# Patient Record
Sex: FEMALE | Race: WHITE | Employment: FULL TIME | URBAN - METROPOLITAN AREA
[De-identification: names, ages, dates, MRNs, and addresses within clinical notes are randomized per-mention and may not be internally consistent; named-entity substitution may affect disease eponyms.]

---

## 2024-08-31 ENCOUNTER — OFFICE VISIT (OUTPATIENT)
Age: 24
End: 2024-08-31

## 2024-08-31 VITALS
BODY MASS INDEX: 25.03 KG/M2 | HEIGHT: 62 IN | DIASTOLIC BLOOD PRESSURE: 80 MMHG | WEIGHT: 136 LBS | SYSTOLIC BLOOD PRESSURE: 110 MMHG | HEART RATE: 88 BPM | OXYGEN SATURATION: 96 % | TEMPERATURE: 97.9 F

## 2024-08-31 DIAGNOSIS — O21.9 NAUSEA AND VOMITING IN PREGNANCY: Primary | ICD-10-CM

## 2024-08-31 RX ORDER — FLUOXETINE 10 MG/1
CAPSULE ORAL
COMMUNITY
Start: 2024-07-07

## 2024-08-31 RX ORDER — SPIRONOLACTONE 100 MG/1
TABLET, FILM COATED ORAL
COMMUNITY
Start: 2024-08-11

## 2024-08-31 RX ORDER — FLUOXETINE 40 MG/1
CAPSULE ORAL
COMMUNITY
Start: 2024-08-11

## 2024-08-31 ASSESSMENT — ENCOUNTER SYMPTOMS
NAUSEA: 1
VOMITING: 1

## 2024-08-31 NOTE — PROGRESS NOTES
Yris Lucas (:  2000) is a 24 y.o. female,New patient, here for evaluation of the following chief complaint(s):  Nausea (Patient is pregnant and now c/o feeling nauseous and vomiting x5 days.)      ASSESSMENT/PLAN:    ICD-10-CM    1. Nausea and vomiting in pregnancy  O21.9         Patient is experiencing nausea and vomiting during her first trimester. VS are stable, but patient has been unable to tolerate any PO since Tuesday. Encouraged patient to begin taking B6 supplements, ginger, and pepcid daily. Avoid triggers. When feeling less nauseous, take small amounts of fluids and bland, small snacks. Educated on signs of worsening condition, such as persistent vomiting with no PO intake and/or dehydration symptoms that would warrant an ER visit. Follow up with OB as scheduled. Her and her  are understanding and agreeable to this plan.     Dx Disposition: Hyperemesis gravidarum, GI bug  Education and handout provided on diagnosis and management of symptoms.   AVS reviewed with patient. Follow up as needed in UC or with PCP for new or worsening symptoms.   Return if symptoms worsen or fail to improve.    SUBJECTIVE/OBJECTIVE:  Patient presents to the clinic with complaints of nausea and vomiting. This started Tuesday and is consistent throughout the day. She states that everything that she eats or drinks comes up. She hasn't taken anything for her symptoms. She is 4 weeks pregnant.  is first OB appt.      History provided by:  Patient   used: No        Vitals:    24 [id number] 24 1002   BP: (!) 134/94 110/80   Site: Right Upper Arm    Position: Sitting    Cuff Size: Large Adult    Pulse: (!) 102 88   Temp: 97.9 °F (36.6 °C)    TempSrc: Oral    SpO2: 96%    Weight: 61.7 kg (136 lb)    Height: 1.575 m (5' 2\")        Review of Systems   Constitutional:  Positive for fatigue.   Gastrointestinal:  Positive for nausea and vomiting.       Physical Exam  Constitutional: